# Patient Record
Sex: FEMALE | Race: WHITE | NOT HISPANIC OR LATINO | URBAN - METROPOLITAN AREA
[De-identification: names, ages, dates, MRNs, and addresses within clinical notes are randomized per-mention and may not be internally consistent; named-entity substitution may affect disease eponyms.]

---

## 2021-12-22 ENCOUNTER — OFFICE VISIT (OUTPATIENT)
Dept: URBAN - METROPOLITAN AREA CLINIC 74 | Facility: CLINIC | Age: 51
End: 2021-12-22

## 2023-11-08 ENCOUNTER — APPOINTMENT (RX ONLY)
Dept: URBAN - METROPOLITAN AREA OTHER 10 | Facility: OTHER | Age: 53
Setting detail: DERMATOLOGY
End: 2023-11-08

## 2023-11-08 DIAGNOSIS — L72.0 EPIDERMAL CYST: ICD-10-CM

## 2023-11-08 DIAGNOSIS — D22 MELANOCYTIC NEVI: ICD-10-CM

## 2023-11-08 DIAGNOSIS — L81.0 POSTINFLAMMATORY HYPERPIGMENTATION: ICD-10-CM

## 2023-11-08 PROBLEM — D22.39 MELANOCYTIC NEVI OF OTHER PARTS OF FACE: Status: ACTIVE | Noted: 2023-11-08

## 2023-11-08 PROBLEM — D23.72 OTHER BENIGN NEOPLASM OF SKIN OF LEFT LOWER LIMB, INCLUDING HIP: Status: ACTIVE | Noted: 2023-11-08

## 2023-11-08 PROCEDURE — ? COUNSELING

## 2023-11-08 PROCEDURE — ? WOOD'S LAMP

## 2023-11-08 PROCEDURE — ? ADDITIONAL NOTES

## 2023-11-08 PROCEDURE — 99202 OFFICE O/P NEW SF 15 MIN: CPT

## 2023-11-08 ASSESSMENT — LOCATION DETAILED DESCRIPTION DERM
LOCATION DETAILED: RIGHT PROXIMAL PRETIBIAL REGION
LOCATION DETAILED: LEFT PROXIMAL PRETIBIAL REGION
LOCATION DETAILED: NASAL SUPRATIP
LOCATION DETAILED: RIGHT DISTAL PRETIBIAL REGION
LOCATION DETAILED: LEFT DISTAL PRETIBIAL REGION
LOCATION DETAILED: LEFT POSTERIOR SHOULDER
LOCATION DETAILED: RIGHT NASAL SIDEWALL

## 2023-11-08 ASSESSMENT — SEVERITY ASSESSMENT: SEVERITY: MILD

## 2023-11-08 ASSESSMENT — LOCATION ZONE DERM
LOCATION ZONE: LEG
LOCATION ZONE: ARM
LOCATION ZONE: NOSE

## 2023-11-08 ASSESSMENT — BSA RASH: BSA RASH: 10

## 2023-11-08 ASSESSMENT — LOCATION SIMPLE DESCRIPTION DERM
LOCATION SIMPLE: RIGHT NOSE
LOCATION SIMPLE: LEFT SHOULDER
LOCATION SIMPLE: RIGHT PRETIBIAL REGION
LOCATION SIMPLE: NOSE
LOCATION SIMPLE: LEFT PRETIBIAL REGION

## 2023-11-08 NOTE — PROCEDURE: ADDITIONAL NOTES
Render Risk Assessment In Note?: no
Additional Notes: pt reports hx of long term COVID rash on legs 2 yrs prior s/p clotrimazole+betamethasone, rash clear at this time. No active rash today, mainly PIHypopigmentation. Discussed w/ patient if rash flares need to biopsy. Pt states she will call when rash flares.
Detail Level: Simple

## 2023-11-09 ENCOUNTER — RX ONLY (OUTPATIENT)
Age: 53
Setting detail: RX ONLY
End: 2023-11-09

## 2023-11-09 RX ORDER — TACROLIMUS 1 MG/G
OINTMENT TOPICAL
Qty: 60 | Refills: 0 | Status: ERX | COMMUNITY
Start: 2023-11-09

## 2024-06-10 ENCOUNTER — TELEPHONE ENCOUNTER (OUTPATIENT)
Dept: URBAN - METROPOLITAN AREA MEDICAL CENTER 28 | Facility: MEDICAL CENTER | Age: 54
End: 2024-06-10

## 2024-07-03 ENCOUNTER — TELEPHONE ENCOUNTER (OUTPATIENT)
Dept: URBAN - METROPOLITAN AREA CLINIC 98 | Facility: CLINIC | Age: 54
End: 2024-07-03

## 2024-07-03 ENCOUNTER — LAB OUTSIDE AN ENCOUNTER (OUTPATIENT)
Dept: URBAN - METROPOLITAN AREA CLINIC 98 | Facility: CLINIC | Age: 54
End: 2024-07-03

## 2024-07-03 ENCOUNTER — OFFICE VISIT (OUTPATIENT)
Dept: URBAN - METROPOLITAN AREA MEDICAL CENTER 28 | Facility: MEDICAL CENTER | Age: 54
End: 2024-07-03
Payer: COMMERCIAL

## 2024-07-03 DIAGNOSIS — R93.3 ABN FINDINGS-GI TRACT: ICD-10-CM

## 2024-07-03 DIAGNOSIS — K83.8 COMMON BILE DUCT DILATION: ICD-10-CM

## 2024-07-03 DIAGNOSIS — K86.89 OTHER SPECIFIED DISEASES OF PANCREAS: ICD-10-CM

## 2024-07-03 LAB
GLUCOSE POC: 105
PERFORMING LAB: (no result)

## 2024-07-03 PROCEDURE — 43242 EGD US FINE NEEDLE BX/ASPIR: CPT | Performed by: INTERNAL MEDICINE

## 2024-07-03 RX ORDER — PRAVASTATIN SODIUM 40 MG/1
TAKE 1 TABLET (40 MG) BY ORAL ROUTE ONCE DAILY TABLET ORAL 1
Qty: 0 | Refills: 0 | Status: ACTIVE | COMMUNITY
Start: 1900-01-01 | End: 1900-01-01

## 2024-07-03 RX ORDER — LEVOTHYROXINE SODIUM 25 UG/1
TAKE 1 TABLET (25 MCG) BY ORAL ROUTE ONCE DAILY TABLET ORAL 1
Qty: 0 | Refills: 0 | Status: ACTIVE | COMMUNITY
Start: 1900-01-01 | End: 1900-01-01

## 2024-07-03 RX ORDER — METFORMIN HYDROCHLORIDE 500 MG/1
TAKE 1 TABLET (500 MG) BY ORAL ROUTE 2 TIMES PER DAY WITH MORNING AND EVENING MEALS TABLET, COATED ORAL 2
Qty: 0 | Refills: 0 | Status: ACTIVE | COMMUNITY
Start: 1900-01-01 | End: 1900-01-01

## 2024-07-03 RX ORDER — LISINOPRIL 20 MG/1
TAKE 1 TABLET (20 MG) BY ORAL ROUTE ONCE DAILY TABLET ORAL 1
Qty: 0 | Refills: 0 | Status: ACTIVE | COMMUNITY
Start: 1900-01-01 | End: 1900-01-01

## 2024-07-16 ENCOUNTER — TELEPHONE ENCOUNTER (OUTPATIENT)
Dept: URBAN - METROPOLITAN AREA CLINIC 98 | Facility: CLINIC | Age: 54
End: 2024-07-16

## 2024-07-24 ENCOUNTER — DASHBOARD ENCOUNTERS (OUTPATIENT)
Age: 54
End: 2024-07-24

## 2024-07-24 ENCOUNTER — OFFICE VISIT (OUTPATIENT)
Dept: URBAN - METROPOLITAN AREA CLINIC 98 | Facility: CLINIC | Age: 54
End: 2024-07-24

## 2024-07-24 VITALS
BODY MASS INDEX: 24.73 KG/M2 | WEIGHT: 134.4 LBS | HEART RATE: 97.1 BPM | SYSTOLIC BLOOD PRESSURE: 108 MMHG | DIASTOLIC BLOOD PRESSURE: 67 MMHG | TEMPERATURE: 97.1 F | HEIGHT: 62 IN

## 2024-07-24 PROBLEM — 197321007: Status: ACTIVE | Noted: 2024-07-24

## 2024-07-24 PROBLEM — 16227531000119109: Status: ACTIVE | Noted: 2024-07-24

## 2024-07-24 RX ORDER — METFORMIN HYDROCHLORIDE 500 MG/1
TAKE 1 TABLET (500 MG) BY ORAL ROUTE 2 TIMES PER DAY WITH MORNING AND EVENING MEALS TABLET, COATED ORAL 2
Qty: 0 | Refills: 0 | Status: ACTIVE | COMMUNITY
Start: 1900-01-01

## 2024-07-24 RX ORDER — LISINOPRIL 20 MG/1
TAKE 1 TABLET (20 MG) BY ORAL ROUTE ONCE DAILY TABLET ORAL 1
Qty: 0 | Refills: 0 | Status: ACTIVE | COMMUNITY
Start: 1900-01-01

## 2024-07-24 RX ORDER — LEVOTHYROXINE SODIUM 25 UG/1
TAKE 1 TABLET (25 MCG) BY ORAL ROUTE ONCE DAILY TABLET ORAL 1
Qty: 0 | Refills: 0 | Status: ACTIVE | COMMUNITY
Start: 1900-01-01

## 2024-07-24 RX ORDER — PRAVASTATIN SODIUM 40 MG/1
TAKE 1 TABLET (40 MG) BY ORAL ROUTE ONCE DAILY TABLET ORAL 1
Qty: 0 | Refills: 0 | Status: ACTIVE | COMMUNITY
Start: 1900-01-01

## 2024-07-24 NOTE — PHYSICAL EXAM GASTROINTESTINAL
Abdomen , soft, EPIGASTRIC AREA tender with palpation, nondistended , no guarding or rigidity , no masses palpable , normal bowel sounds , Liver and Spleen,  no hepatosplenomegaly , liver nontender

## 2024-07-24 NOTE — HPI-TODAY'S VISIT:
Visit 7/24/24 Shakila Barber NP - 53 yo female pt. here today to follow up after EUS for pancreatic cyst results and receive MRI order - PCP Dr. Sonu Elder; progress note will be sent following visit - Referred by primary GI Dr. Amanda Plaza, [facility]; consult with Dr. Sorenson- EUS - EGD/EUS 7/3/24 with Dr. Sorenson- normal esophagus, normal stomach, normal duodenum. Cystic lesion in the pancreatic body 14 mm by 12 mm, no obvious communication with pancreatic duct. FNA- sent amylase, CEA and cytology. No sign of significant pathology in pancreatic head, pancreatic tail and main pancreatic duct.  Evidence of CCY. CBD dilated up to 8 mm. - Cytology- pancreatic body cyst- no malignant cells seen. CEA 7.9 ng/mL, Amylase 1,720 U/L. Biological behavior- Benign. Consistent with pancreatic serous cystadenoma - MRI/MRCP 6/6/24- pancreas no mass or inflammation. No pancreatic ductal dilatation. Pancreatic cyst 9x10x9 mm in the ??tail of the pancreas - No change in pancreatic cyst with imaging for the past 2 years

## 2024-07-25 ENCOUNTER — TELEPHONE ENCOUNTER (OUTPATIENT)
Dept: URBAN - METROPOLITAN AREA CLINIC 98 | Facility: CLINIC | Age: 54
End: 2024-07-25

## 2024-07-25 LAB
ALBUMIN: 4.9
ALKALINE PHOSPHATASE: 105
ALT (SGPT): 22
AST (SGOT): 25
BILIRUBIN, TOTAL: 0.2
BUN/CREATININE RATIO: 10
BUN: 9
CA 19-9: 66
CALCIUM: 10.7
CARBON DIOXIDE, TOTAL: 26
CHLORIDE: 100
CREATININE: 0.88
EGFR: 77
GLOBULIN, TOTAL: 2.7
GLUCOSE: 109
POTASSIUM: 4.5
PROTEIN, TOTAL: 7.6
SODIUM: 143

## 2024-08-07 ENCOUNTER — TELEPHONE ENCOUNTER (OUTPATIENT)
Dept: URBAN - METROPOLITAN AREA CLINIC 97 | Facility: CLINIC | Age: 54
End: 2024-08-07

## 2024-08-19 ENCOUNTER — TELEPHONE ENCOUNTER (OUTPATIENT)
Dept: URBAN - METROPOLITAN AREA CLINIC 98 | Facility: CLINIC | Age: 54
End: 2024-08-19

## 2024-11-04 ENCOUNTER — LAB OUTSIDE AN ENCOUNTER (OUTPATIENT)
Dept: URBAN - METROPOLITAN AREA CLINIC 97 | Facility: CLINIC | Age: 54
End: 2024-11-04

## 2024-11-05 ENCOUNTER — TELEPHONE ENCOUNTER (OUTPATIENT)
Dept: URBAN - METROPOLITAN AREA CLINIC 97 | Facility: CLINIC | Age: 54
End: 2024-11-05

## 2024-12-04 ENCOUNTER — OFFICE VISIT (OUTPATIENT)
Dept: URBAN - METROPOLITAN AREA TELEHEALTH 2 | Facility: TELEHEALTH | Age: 54
End: 2024-12-04
Payer: COMMERCIAL

## 2024-12-04 VITALS — WEIGHT: 133 LBS | HEIGHT: 62 IN | BODY MASS INDEX: 24.48 KG/M2

## 2024-12-04 DIAGNOSIS — D13.6 PANCREATIC CYSTADENOMA: ICD-10-CM

## 2024-12-04 DIAGNOSIS — K76.0 FATTY LIVER: ICD-10-CM

## 2024-12-04 DIAGNOSIS — R97.8 ELEVATED CA 19-9 LEVEL: ICD-10-CM

## 2024-12-04 PROCEDURE — 99443 PHONE E/M BY PHYS 21-30 MIN: CPT

## 2024-12-04 RX ORDER — LEVOTHYROXINE SODIUM 25 UG/1
TAKE 1 TABLET (25 MCG) BY ORAL ROUTE ONCE DAILY TABLET ORAL 1
Qty: 0 | Refills: 0 | Status: ACTIVE | COMMUNITY
Start: 1900-01-01

## 2024-12-04 RX ORDER — PRAVASTATIN SODIUM 40 MG/1
TAKE 1 TABLET (40 MG) BY ORAL ROUTE ONCE DAILY TABLET ORAL 1
Qty: 0 | Refills: 0 | Status: ACTIVE | COMMUNITY
Start: 1900-01-01

## 2024-12-04 RX ORDER — LISINOPRIL 20 MG/1
TAKE 1 TABLET (20 MG) BY ORAL ROUTE ONCE DAILY TABLET ORAL 1
Qty: 0 | Refills: 0 | Status: ACTIVE | COMMUNITY
Start: 1900-01-01

## 2024-12-04 RX ORDER — METFORMIN HYDROCHLORIDE 500 MG/1
TAKE 1 TABLET (500 MG) BY ORAL ROUTE 2 TIMES PER DAY WITH MORNING AND EVENING MEALS TABLET, COATED ORAL 2
Qty: 0 | Refills: 0 | Status: ACTIVE | COMMUNITY
Start: 1900-01-01

## 2024-12-04 NOTE — HPI-TODAY'S VISIT:
Visit 7/24/24 Shakila Barber NP - 53 yo female pt. here today to follow up after EUS for pancreatic cyst results and receive MRI order - PCP Dr. Sonu Elder; progress note will be sent following visit - Referred by primary GI Dr. Amanda Plaza, [facility]; consult with Dr. Sorenson- EUS - EGD/EUS 7/3/24 with Dr. Sorenson- normal esophagus, normal stomach, normal duodenum. Cystic lesion in the pancreatic body 14 mm by 12 mm, no obvious communication with pancreatic duct. FNA- sent amylase, CEA and cytology. No sign of significant pathology in pancreatic head, pancreatic tail and main pancreatic duct.  Evidence of CCY. CBD dilated up to 8 mm. - Cytology- pancreatic body cyst- no malignant cells seen. CEA 7.9 ng/mL, Amylase 1,720 U/L. Biological behavior- Benign. Consistent with pancreatic serous cystadenoma - MRI/MRCP 6/6/24- pancreas no mass or inflammation. No pancreatic ductal dilatation. Pancreatic cyst 9x10x9 mm in the ??tail of the pancreas - No change in pancreatic cyst with imaging for the past 2 years  12/4/24- Alicia Barber NP - 53 yo female present for TH phone to discuss pancreatic cyst and surveillance MRI - MRI completed [facility] 11/5/24- MRI/MRCP 11/5/24- pancreatic cystic tail lesion seen on prior imaging 9x10x9 cm not present on this evaluation. Pancreas with no abnormality - Labs 11/4 with PCP 11/2024- not in chart. Per patient- CA 19-9 (28), alk phos (105 ), ast (19 ), alt (13) - Sometimes right side pain unable to correlate with diet - Denies nausea/vomiting, weight loss

## 2025-06-13 ENCOUNTER — OFFICE VISIT (OUTPATIENT)
Dept: URBAN - METROPOLITAN AREA CLINIC 98 | Facility: CLINIC | Age: 55
End: 2025-06-13
Payer: COMMERCIAL

## 2025-06-13 DIAGNOSIS — D13.6 PANCREATIC CYSTADENOMA: ICD-10-CM

## 2025-06-13 DIAGNOSIS — R97.8 ELEVATED CA 19-9 LEVEL: ICD-10-CM

## 2025-06-13 DIAGNOSIS — K76.0 FATTY LIVER: ICD-10-CM

## 2025-06-13 PROCEDURE — 99214 OFFICE O/P EST MOD 30 MIN: CPT | Performed by: INTERNAL MEDICINE

## 2025-06-13 RX ORDER — METFORMIN HYDROCHLORIDE 500 MG/1
TAKE 1 TABLET (500 MG) BY ORAL ROUTE 2 TIMES PER DAY WITH MORNING AND EVENING MEALS TABLET, COATED ORAL 2
Qty: 0 | Refills: 0 | Status: ACTIVE | COMMUNITY
Start: 1900-01-01

## 2025-06-13 RX ORDER — PRAVASTATIN SODIUM 40 MG/1
TAKE 1 TABLET (40 MG) BY ORAL ROUTE ONCE DAILY TABLET ORAL 1
Qty: 0 | Refills: 0 | Status: ON HOLD | COMMUNITY
Start: 1900-01-01

## 2025-06-13 RX ORDER — LEVOTHYROXINE SODIUM 0.03 MG/1
TAKE 1 TABLET (25 MCG) BY ORAL ROUTE ONCE DAILY TABLET ORAL 1
Qty: 0 | Refills: 0 | Status: ACTIVE | COMMUNITY
Start: 1900-01-01

## 2025-06-13 RX ORDER — LISINOPRIL 20 MG/1
TAKE 1 TABLET (20 MG) BY ORAL ROUTE ONCE DAILY TABLET ORAL 1
Qty: 0 | Refills: 0 | Status: ACTIVE | COMMUNITY
Start: 1900-01-01

## 2025-06-13 NOTE — HPI-TODAY'S VISIT:
Visit 7/24/24 Shakila Barber NP - 53 yo female pt. here today to follow up after EUS for pancreatic cyst results and receive MRI order - PCP Dr. Sonu Elder; progress note will be sent following visit - Referred by primary GI Dr. Amanda Plaza, [facility]; consult with Dr. Sorenson- EUS - EGD/EUS 7/3/24 with Dr. Sorenson- normal esophagus, normal stomach, normal duodenum. Cystic lesion in the pancreatic body 14 mm by 12 mm, no obvious communication with pancreatic duct. FNA- sent amylase, CEA and cytology. No sign of significant pathology in pancreatic head, pancreatic tail and main pancreatic duct.  Evidence of CCY. CBD dilated up to 8 mm. - Cytology- pancreatic body cyst- no malignant cells seen. CEA 7.9 ng/mL, Amylase 1,720 U/L. Biological behavior- Benign. Consistent with pancreatic serous cystadenoma - MRI/MRCP 6/6/24- pancreas no mass or inflammation. No pancreatic ductal dilatation. Pancreatic cyst 9x10x9 mm in the ??tail of the pancreas - No change in pancreatic cyst with imaging for the past 2 years  12/4/24- Alicia Barber NP - 53 yo female present for TH phone to discuss pancreatic cyst and surveillance MRI - MRI completed AHI 11/5/24- MRI/MRCP 11/5/24- pancreatic cystic tail lesion seen on prior imaging 9x10x9 cm not present on this evaluation. Pancreas with no abnormality - Labs 11/4 with PCP 11/2024- not in chart. Per patient- CA 19-9 (28), alk phos (105 ), ast (19 ), alt (13) - Sometimes right side pain unable to correlate with diet - Denies nausea/vomiting, weight loss  Present - latest CEA up to 109 - EUS in 2024 - non smoker - feels well.

## 2025-06-19 ENCOUNTER — LAB OUTSIDE AN ENCOUNTER (OUTPATIENT)
Dept: URBAN - METROPOLITAN AREA CLINIC 98 | Facility: CLINIC | Age: 55
End: 2025-06-19

## 2025-06-23 ENCOUNTER — TELEPHONE ENCOUNTER (OUTPATIENT)
Dept: URBAN - METROPOLITAN AREA CLINIC 98 | Facility: CLINIC | Age: 55
End: 2025-06-23

## 2025-06-23 ENCOUNTER — LAB OUTSIDE AN ENCOUNTER (OUTPATIENT)
Dept: URBAN - METROPOLITAN AREA CLINIC 98 | Facility: CLINIC | Age: 55
End: 2025-06-23

## 2025-06-26 ENCOUNTER — OFFICE VISIT (OUTPATIENT)
Dept: URBAN - METROPOLITAN AREA CLINIC 98 | Facility: CLINIC | Age: 55
End: 2025-06-26

## 2025-06-27 ENCOUNTER — OFFICE VISIT (OUTPATIENT)
Dept: URBAN - METROPOLITAN AREA CLINIC 97 | Facility: CLINIC | Age: 55
End: 2025-06-27
Payer: COMMERCIAL

## 2025-06-27 ENCOUNTER — CLAIMS CREATED FROM THE CLAIM WINDOW (OUTPATIENT)
Dept: URBAN - METROPOLITAN AREA CLINIC 117 | Facility: CLINIC | Age: 55
End: 2025-06-27
Payer: COMMERCIAL

## 2025-06-27 DIAGNOSIS — K76.0 STEATOSIS OF LIVER: ICD-10-CM

## 2025-06-27 DIAGNOSIS — K74.02 STAGE 3 HEPATIC FIBROSIS: ICD-10-CM

## 2025-06-27 PROCEDURE — 76981 USE PARENCHYMA: CPT | Performed by: INTERNAL MEDICINE

## 2025-06-27 RX ORDER — LEVOTHYROXINE SODIUM 0.03 MG/1
TAKE 1 TABLET (25 MCG) BY ORAL ROUTE ONCE DAILY TABLET ORAL 1
Qty: 0 | Refills: 0 | Status: ACTIVE | COMMUNITY
Start: 1900-01-01

## 2025-06-27 RX ORDER — METFORMIN HYDROCHLORIDE 500 MG/1
TAKE 1 TABLET (500 MG) BY ORAL ROUTE 2 TIMES PER DAY WITH MORNING AND EVENING MEALS TABLET, COATED ORAL 2
Qty: 0 | Refills: 0 | Status: ACTIVE | COMMUNITY
Start: 1900-01-01

## 2025-06-27 RX ORDER — LISINOPRIL 20 MG/1
TAKE 1 TABLET (20 MG) BY ORAL ROUTE ONCE DAILY TABLET ORAL 1
Qty: 0 | Refills: 0 | Status: ACTIVE | COMMUNITY
Start: 1900-01-01

## 2025-06-27 RX ORDER — PRAVASTATIN SODIUM 40 MG/1
TAKE 1 TABLET (40 MG) BY ORAL ROUTE ONCE DAILY TABLET ORAL 1
Qty: 0 | Refills: 0 | Status: ON HOLD | COMMUNITY
Start: 1900-01-01

## 2025-06-30 ENCOUNTER — TELEPHONE ENCOUNTER (OUTPATIENT)
Dept: URBAN - METROPOLITAN AREA CLINIC 98 | Facility: CLINIC | Age: 55
End: 2025-06-30

## 2025-07-16 ENCOUNTER — TELEPHONE ENCOUNTER (OUTPATIENT)
Dept: URBAN - METROPOLITAN AREA CLINIC 98 | Facility: CLINIC | Age: 55
End: 2025-07-16

## 2025-07-22 ENCOUNTER — OFFICE VISIT (OUTPATIENT)
Dept: URBAN - METROPOLITAN AREA TELEHEALTH 2 | Facility: TELEHEALTH | Age: 55
End: 2025-07-22

## 2025-07-23 ENCOUNTER — TELEPHONE ENCOUNTER (OUTPATIENT)
Dept: URBAN - METROPOLITAN AREA MEDICAL CENTER 28 | Facility: MEDICAL CENTER | Age: 55
End: 2025-07-23

## 2025-07-24 ENCOUNTER — TELEPHONE ENCOUNTER (OUTPATIENT)
Dept: URBAN - METROPOLITAN AREA MEDICAL CENTER 28 | Facility: MEDICAL CENTER | Age: 55
End: 2025-07-24

## 2025-08-07 ENCOUNTER — TELEPHONE ENCOUNTER (OUTPATIENT)
Dept: URBAN - METROPOLITAN AREA CLINIC 98 | Facility: CLINIC | Age: 55
End: 2025-08-07

## 2025-08-13 ENCOUNTER — TELEPHONE ENCOUNTER (OUTPATIENT)
Dept: URBAN - METROPOLITAN AREA CLINIC 98 | Facility: CLINIC | Age: 55
End: 2025-08-13

## 2025-08-20 ENCOUNTER — LAB OUTSIDE AN ENCOUNTER (OUTPATIENT)
Dept: URBAN - METROPOLITAN AREA CLINIC 98 | Facility: CLINIC | Age: 55
End: 2025-08-20

## 2025-08-20 ENCOUNTER — TELEPHONE ENCOUNTER (OUTPATIENT)
Dept: URBAN - METROPOLITAN AREA CLINIC 98 | Facility: CLINIC | Age: 55
End: 2025-08-20

## 2025-08-20 ENCOUNTER — OFFICE VISIT (OUTPATIENT)
Dept: URBAN - METROPOLITAN AREA MEDICAL CENTER 28 | Facility: MEDICAL CENTER | Age: 55
End: 2025-08-20
Payer: COMMERCIAL

## 2025-08-20 DIAGNOSIS — K86.89 OTHER SPECIFIED DISEASES OF PANCREAS: ICD-10-CM

## 2025-08-20 DIAGNOSIS — R97.8 ELEVATED CA 19-9 LEVEL: ICD-10-CM

## 2025-08-20 DIAGNOSIS — R93.3 ABN FINDINGS-GI TRACT: ICD-10-CM

## 2025-08-20 LAB
GLUCOSE POC: 111
PERFORMING LAB: (no result)

## 2025-08-20 PROCEDURE — 43259 EGD US EXAM DUODENUM/JEJUNUM: CPT | Performed by: INTERNAL MEDICINE

## 2025-08-20 RX ORDER — PRAVASTATIN SODIUM 40 MG/1
TAKE 1 TABLET (40 MG) BY ORAL ROUTE ONCE DAILY TABLET ORAL 1
Qty: 0 | Refills: 0 | Status: ON HOLD | COMMUNITY
Start: 1900-01-01

## 2025-08-20 RX ORDER — METFORMIN HYDROCHLORIDE 500 MG/1
TAKE 1 TABLET (500 MG) BY ORAL ROUTE 2 TIMES PER DAY WITH MORNING AND EVENING MEALS TABLET, COATED ORAL 2
Qty: 0 | Refills: 0 | Status: ACTIVE | COMMUNITY
Start: 1900-01-01

## 2025-08-20 RX ORDER — LEVOTHYROXINE SODIUM 0.03 MG/1
TAKE 1 TABLET (25 MCG) BY ORAL ROUTE ONCE DAILY TABLET ORAL 1
Qty: 0 | Refills: 0 | Status: ACTIVE | COMMUNITY
Start: 1900-01-01

## 2025-08-20 RX ORDER — LISINOPRIL 20 MG/1
TAKE 1 TABLET (20 MG) BY ORAL ROUTE ONCE DAILY TABLET ORAL 1
Qty: 0 | Refills: 0 | Status: ACTIVE | COMMUNITY
Start: 1900-01-01